# Patient Record
Sex: MALE | Race: WHITE | Employment: FULL TIME | URBAN - METROPOLITAN AREA
[De-identification: names, ages, dates, MRNs, and addresses within clinical notes are randomized per-mention and may not be internally consistent; named-entity substitution may affect disease eponyms.]

---

## 2018-01-20 ENCOUNTER — APPOINTMENT (OUTPATIENT)
Dept: CT IMAGING | Age: 28
End: 2018-01-20
Attending: EMERGENCY MEDICINE
Payer: COMMERCIAL

## 2018-01-20 ENCOUNTER — HOSPITAL ENCOUNTER (EMERGENCY)
Age: 28
Discharge: HOME | End: 2018-01-20
Attending: EMERGENCY MEDICINE
Payer: COMMERCIAL

## 2018-01-20 ENCOUNTER — APPOINTMENT (OUTPATIENT)
Dept: GENERAL RADIOLOGY | Age: 28
End: 2018-01-20
Attending: EMERGENCY MEDICINE
Payer: COMMERCIAL

## 2018-01-20 VITALS
HEIGHT: 69 IN | HEART RATE: 69 BPM | SYSTOLIC BLOOD PRESSURE: 118 MMHG | WEIGHT: 130 LBS | OXYGEN SATURATION: 96 % | BODY MASS INDEX: 19.26 KG/M2 | DIASTOLIC BLOOD PRESSURE: 73 MMHG | RESPIRATION RATE: 16 BRPM | TEMPERATURE: 97.3 F

## 2018-01-20 DIAGNOSIS — M54.2 NECK PAIN: Primary | ICD-10-CM

## 2018-01-20 DIAGNOSIS — M54.6 ACUTE THORACIC BACK PAIN, UNSPECIFIED BACK PAIN LATERALITY: ICD-10-CM

## 2018-01-20 PROCEDURE — 99282 EMERGENCY DEPT VISIT SF MDM: CPT

## 2018-01-20 PROCEDURE — 72050 X-RAY EXAM NECK SPINE 4/5VWS: CPT

## 2018-01-20 PROCEDURE — 72070 X-RAY EXAM THORAC SPINE 2VWS: CPT

## 2018-01-20 RX ORDER — METHOCARBAMOL 750 MG/1
750 TABLET, FILM COATED ORAL 4 TIMES DAILY
Qty: 30 TAB | Refills: 0 | Status: SHIPPED | OUTPATIENT
Start: 2018-01-20

## 2018-01-20 RX ORDER — DICLOFENAC SODIUM 50 MG/1
50 TABLET, DELAYED RELEASE ORAL 2 TIMES DAILY
Qty: 20 TAB | Refills: 0 | Status: SHIPPED | OUTPATIENT
Start: 2018-01-20 | End: 2022-03-09 | Stop reason: ALTCHOICE

## 2018-01-20 NOTE — ED TRIAGE NOTES
Pt reports he was folding a blanket and it got caught on his head pulling his neck and upper back. Pt has had surgery on back due to scoliosis and ah had neck fusion.    Pt saw ortho PA 2 weeks ago and had xrays

## 2018-01-20 NOTE — ED PROVIDER NOTES
HPI Comments: 32 y.o. male with past medical history significant for scoliosis, herniated cervical disc, cervical fusion, and chronic pain who presents from home with chief complaint of neck pain. Pt states he was shaking a blanket, about four hours ago, when the blanket got caught on his head. Pt states he pulled the blanket forward and it jerked his head and neck forward as well. Pt reports currently having lower neck pain and upper back pain that is worsened with movement. Pt states he occasionally has pain radiating down his L-posterior arm. Pt states he took two Advil tablets and used [employer] patches with some relief. Pt reports having a history of scoliosis with two Araiza rods and 18 screws placed in his back. Pt also states he had a cervical fusion done six years ago. Pt denies currently having L-arm weakness. There are no other acute medical concerns at this time. PCP: Tapan Jang MD    Note written by Nakia Bailey, as dictated by Darren Arora MD 6:50 PM    The history is provided by the patient. Past Medical History:   Diagnosis Date    Chronic pain     UPPER BACK, BETWEEN SHOULDER BLADES    Herniated cervical disc     History of blood transfusion 2005    [facility] [address], NO REACTION    Scoliosis        Past Surgical History:   Procedure Laterality Date    HX ADENOIDECTOMY      HX ORTHOPAEDIC  2004    scoliosis - 2 rods and 18 screws    HX OTHER SURGICAL      WISDOM TEETH EXTRACTION    NEUROLOGICAL PROCEDURE UNLISTED  2005    SCOLISOSIS MID AND UPPER BACK         History reviewed. No pertinent family history. Social History     Social History    Marital status: SINGLE     Spouse name: N/A    Number of children: N/A    Years of education: N/A     Occupational History    Not on file.      Social History Main Topics    Smoking status: Current Every Day Smoker     Packs/day: 0.75     Years: 9.00    Smokeless tobacco: Never Used    Alcohol use 1.5 oz/week     3 Cans of beer per week    Drug use: No    Sexual activity: Not on file     Other Topics Concern    Not on file     Social History Narrative         ALLERGIES: Review of patient's allergies indicates no known allergies. Review of Systems   Constitutional: Negative for fever. Eyes: Negative for visual disturbance. Respiratory: Negative for cough, shortness of breath and wheezing. Cardiovascular: Negative for chest pain and leg swelling. Gastrointestinal: Negative for abdominal pain, diarrhea, nausea and vomiting. Genitourinary: Negative for dysuria. Musculoskeletal: Positive for back pain and neck pain. Negative for neck stiffness. Skin: Negative for rash. Neurological: Negative. Negative for syncope, weakness and headaches. Psychiatric/Behavioral: Negative for confusion. All other systems reviewed and are negative. Vitals:    01/20/18 1652   BP: 128/84   Pulse: 70   Resp: 16   Temp: 98.3 °F (36.8 °C)   SpO2: 100%   Weight: 59 kg (130 lb)   Height: 5' 9\" (1.753 m)            Physical Exam   Constitutional: He appears well-developed and well-nourished. No distress. HENT:   Head: Normocephalic. Eyes: Pupils are equal, round, and reactive to light. Cardiovascular: Normal rate and regular rhythm. No murmur heard. Pulmonary/Chest: Effort normal and breath sounds normal. No respiratory distress. Abdominal: Soft. There is no tenderness. Musculoskeletal: He exhibits no edema. Tenderness over lower cervical spine and upper half of thoracic spine. Neurological: He is alert. He has normal strength. No cranial nerve deficit. Skin: Skin is warm and dry. Psychiatric: He has a normal mood and affect. His behavior is normal.   Nursing note and vitals reviewed. Note written by Ruben Hardy.  Elizabeth Craig, as dictated by Ellie Perkins MD 6:50 PM       [facility]  ED Course       Procedures    CONSULT NOTE:  9:20 PM Ellie Perkins MD spoke with JAN Wang, Consult for Orthopedics. Discussed available diagnostic tests and clinical findings. He will review x-rays and send an email to Dr. Lola Perera him of the x-rays. Pt should call the office Monday morning to get an appointment with Dr. Courtney Mauro       Discussed test results, clinical impression,  and need for followup in 1-2 days . Patients questions were answered. Discharge instructions given to patient.

## 2018-01-21 NOTE — ED NOTES
Bedside report received from [facility], patient resting comfortably, patient is tearful, denies any needs at this time, KARLA

## 2018-01-21 NOTE — ED NOTES
Patient A&O x4, verbalizes understanding of discharge instructions and denies any questions at this time, patient V/S stable, patient ambulated off the unit

## 2018-01-21 NOTE — DISCHARGE INSTRUCTIONS

## 2022-03-09 ENCOUNTER — OFFICE VISIT (OUTPATIENT)
Dept: ORTHOPEDIC SURGERY | Age: 32
End: 2022-03-09
Payer: COMMERCIAL

## 2022-03-09 VITALS — BODY MASS INDEX: 20.44 KG/M2 | HEIGHT: 69 IN | WEIGHT: 138 LBS

## 2022-03-09 DIAGNOSIS — M54.2 NECK PAIN: Primary | ICD-10-CM

## 2022-03-09 DIAGNOSIS — Z98.1 STATUS POST CERVICAL SPINAL FUSION: ICD-10-CM

## 2022-03-09 DIAGNOSIS — M48.02 CERVICAL STENOSIS OF SPINE: ICD-10-CM

## 2022-03-09 PROCEDURE — 99204 OFFICE O/P NEW MOD 45 MIN: CPT | Performed by: PHYSICIAN ASSISTANT

## 2022-03-09 RX ORDER — DICLOFENAC SODIUM 75 MG/1
75 TABLET, DELAYED RELEASE ORAL 2 TIMES DAILY WITH MEALS
Qty: 60 TABLET | Refills: 1 | Status: SHIPPED | OUTPATIENT
Start: 2022-03-09

## 2022-03-09 RX ORDER — METOPROLOL TARTRATE 25 MG/1
TABLET, FILM COATED ORAL
COMMUNITY
Start: 2021-10-15

## 2022-03-09 RX ORDER — DEXTROAMPHETAMINE SACCHARATE, AMPHETAMINE ASPARTATE, DEXTROAMPHETAMINE SULFATE AND AMPHETAMINE SULFATE 3.75; 3.75; 3.75; 3.75 MG/1; MG/1; MG/1; MG/1
TABLET ORAL
COMMUNITY

## 2022-03-09 RX ORDER — GABAPENTIN 300 MG/1
300 CAPSULE ORAL
Qty: 30 CAPSULE | Refills: 1 | Status: SHIPPED | OUTPATIENT
Start: 2022-03-09

## 2022-03-09 NOTE — LETTER
CONTROLLED SUBSTANCE MEDICATION AGREEMENT  Patient Name: Saint Mannheim  Patient YOB: 1990   [id number]      I understand, that controlled substance medications may be used to help better manage my symptoms and to improve my ability to function at home, work and in social settings. However, I also understand that these medications do have risks, which have been discussed with me, including possible development of physical or psychological dependence. I understand that successful treatment requires mutual trust and honesty between me and my provider. I understand and agree that following this Medication Agreement is necessary in continuing my provider-patient relationship and the success of my treatment plan. Explanation from my Provider: Benefits and Goals of Controlled Substance Medications: There are two potential goals for your treatment: (1) decreased pain and suffering (2) improved daily life functions. There are many possible treatments for your chronic condition(s). Alternatives such as physical therapy, yoga, massage, home daily exercise, meditation, relaxation techniques, injections, chiropractic manipulations, surgery, cognitive therapy, hypnosis and many medications that are not habit-forming may be used. Use of controlled substance medications may be helpful, but they are unlikely to resolve all symptoms or restore all function. Explanation from my Provider: Risks of Controlled Substance Medications:   Opioid pain medications: These medications can lead to problems such as addiction/dependence, sedation, lightheadedness/dizziness, memory issues, falls, constipation, nausea, or vomiting. They may also impair the ability to drive or operate machinery. Additionally, these medications may lower testosterone levels, leading to loss of bone strength, stamina and sex drive.   They may cause problems with breathing, sleep apnea and reduced coughing, which is especially dangerous for patients with lung disease. Overdose or dangerous interactions with alcohol and other medications may occur, leading to death. Hyperalgesia may develop, which means patients receiving opioids for the treatment of pain may become more sensitive to certain painful stimuli, and in some cases, experience pain from ordinarily non-painful stimuli. Women between the ages of 14-53 who could become pregnant should carefully weigh the risks and benefits of opioids with their physicians, as these medications increase the risk of pregnancy complications, including miscarriage,  delivery and stillbirth. It is also possible for babies to be born addicted to opioids. Opioid dependence withdrawal symptoms may include; feelings of uneasiness, increased pain, irritability, belly pain, diarrhea, sweats and goose-flesh. Testosterone replacement therapy:  Potential side effects include increased risk of stroke and heart attack, blood clots, increased blood pressure, increased cholesterol, enlarged prostate, sleep apnea, irritability/aggression and other mood disorders, and decreased fertility. Trina James (1990)             Page 1 of 4    Initials:_______    Benzodiazepines and non-benzodiazepine sleep medications: These medications can lead to problems such as addiction/dependence, sedation, fatigue, lightheadedness, dizziness, incoordination, falls, depression, hallucinations, and impaired judgment, memory and concentration. The ability to drive and operate machinery may also be affected. Abnormal sleep-related behaviors have been reported, including sleepwalking, driving, making telephone calls, eating, or having sex while not fully awake. These medications can suppress breathing and worsen sleep apnea, particularly when combined with alcohol or other sedating medications, potentially leading to death.  Dependence withdrawal symptoms may include tremors, anxiety, hallucinations and seizures. Stimulants:  Common adverse effects include addiction/dependence, increased blood pressure and heart rate, decreased appetite, nausea, involuntary weight loss, insomnia,  irritability, and headaches. These risks may increase when these medications are combined with other stimulants, such as caffeine pills or energy drinks, certain weight loss supplements and oral decongestants. Dependence withdrawal symptoms may include depressed mood, loss of interest, suicidal thoughts, anxiety, fatigue, appetite changes and agitation. I agree and understand that I and my prescriber have the following rights and responsibilities regarding my treatment plan:   1. MY RIGHTS:  To be informed of my treatment and medication plan. To be an active participant in my health and wellbeing. 2. MY RESPONSIBILITY AND UNDERSTANDING FOR USE OF MEDICATIONS   I will take medications at the dose and frequency as directed. For my safety, I will not increase or change how I take my medications without the recommendation of my healthcare provider.  I will actively participate in any program recommended by my provider which may improve function, including social, physical, psychological programs.  I will not take my medications with alcohol or other drugs not prescribed to me. I understand that drinking alcohol with my medications increases the chances of side effects, including reduced breathing rate and could lead to personal injury when operating machinery.  I understand that if I have a history of substance use disorders, including alcohol or other illicit drugs, that I may be at increased risk of addiction to my medications.  I agree to notify my provider immediately if I should become pregnant so that my treatment plan can be adjusted.    I agree and understand that I shall only receive controlled substance medications from the prescriber that signed this agreement unless there is written agreement among other prescribers of controlled substances outlining the responsibility of the medications being prescribed.  I understand that the if the controlled medication is not helping to achieve goals, the dosage may be tapered and no longer prescribed. 3. MY RESPONSIBILITY FOR COMMUNICATION / PRESCRIPTION RENEWALS   I agree that all controlled substance medications that I take will be prescribed only by my provider. If another healthcare provider prescribes me medication in an emergency, I will notify my provider within seventy-two (72) hours. Saint Mannheim (1990)             Page 2 of 4    Initials:_______  Dinero I will arrange for refills at the prescribed interval ONLY during regular office hours. I will not ask for refills earlier than agreed, after-hours, on holidays or weekends. Refills may take up to 72 hours for processing and prescriptions to reach the pharmacy.  I will inform my other health care providers that I am taking these medications and of the existence of this [id number]. In the event of an emergency, I will provide the same information to the emergency department prescribers.  I will keep my provider updated on the pharmacy I am using for controlled medication prescription filling. 4. MY RESPONSIBILITY FOR PROTECTING MEDICATIONS   I will protect my prescriptions and medications. I understand that lost or misplaced prescriptions will not be replaced.  I will keep medications only for my own use and will not share them with others. I will keep all medications away from children.  I agree that if my medications are adjusted or discontinued, I will properly dispose of any remaining medications. I understand that I will be required to dispose of any remaining controlled medications as, directed by my prescriber, prior to being provided with any prescriptions for other controlled medications.   Medication drop box locations can be found at: [employer]  5. MY RESPONSIBILITY WITH ILLEGAL DRUGS    I will not use illegal or street drugs or another person's prescription medications not prescribed to me.  If there are identified addiction type symptoms, then referral to a program may be provided by my provider and I agree to follow through with this recommendation. 6. MY RESPONSIBILITY FOR COOPERATION WITH INVESTIGATIONS   I understand that my provider will comply with any applicable law and may discuss my use and/or possible misuse/abuse of controlled substances and alcohol, as appropriate, with any health care provider involved in my care, pharmacist, or legal authority.  I authorize my provider and pharmacy to cooperate fully with law enforcement agencies (as permitted by law) in the investigation of any possible misuse, sale, or other diversion of my controlled substances.  I agree to waive any applicable privilege or right of privacy or confidentiality with respect to these authorizations. 7. PROVIDERS RIGHT TO MONITOR FOR SAFETY: PRESCRIPTION MONITORING / DRUG TESTING   I consent to drug/toxicology screening and will submit to a drug screen upon my providers request to assure I am only taking the prescribed drugs for my safety monitoring. I understand that a drug screen is a laboratory test in which a sample of my urine, blood or saliva is checked to see what drugs I have been taking. This may entail an observed urine specimen, which means that a nurse or other health care provider may watch me provide urine, and I will cooperate if I am asked to provide an observed specimen. Cary Lund (1990)             Page 3 of 4    Initials:_______  Anayeli Haas I understand that my provider will check a copy of my State Prescription Monitoring Program () Report in order to safely prescribe medications.      Pill Counts: I consent to pill counts when requested. I may be asked to bring all my prescribed controlled substance medications, in their original bottles, to all of my scheduled appointments. In addition, my provider may ask me to come to the practice at any time for a random pill count. 8. TERMINATION OF THIS AGREEMENT   For my safety, my prescriber has the right to stop prescribing controlled substance medications and may end this agreement.  Conditions that may result in termination of this agreement:  a. I do not show any improvement in pain, or my activity has not improved. b. I develop rapid tolerance or loss of improvement, as described in my treatment plan.  c. I develop significant side effects from the medication. d. My behavior is not consistent with the responsibilities outlined above, thereby causing safety concerns to continue prescribing controlled substance medications. e. I fail to follow the terms of this agreement. f. Other:____________________________     UNDERSTANDING THIS MEDICATION AGREEMENT:    I have read the above and have had all my questions answered. For chronic disease management, I know that my symptoms can be managed with many types of treatments. A chronic medication trial may be part of my treatment, but I must be an active participant in my care. Medication therapy is only one part of my symptom management plan. In some cases, there may be limited scientific evidence to support the chronic use of certain medications to improve symptoms and daily function. Furthermore, in certain circumstances, there may be scientific information that suggests that the use of chronic controlled substances may worsen my symptoms and increase my risk of unintentional death directly related to this medication therapy.   I know that if my provider feels my risk from controlled medications is greater than my benefit, I will have my controlled substance medication(s) compassionately lowered or removed altogether. I further agree to allow this office to contact my HIPAA contact if there are concerns about my safety and use of the controlled medications. I have agreed to use the prescribed controlled substance medications to me as instructed by my provider and as stated in this Medication Agreement. My initial on each page and my signature below shows that I have read each page and I have had the opportunity to ask questions with answers provided by my provider.       Patient Name (Printed): _____________________________________    Patient Signature:  ______________________   Date: _____________      Prescriber Name (Printed): ___________________________________    Prescriber Signature: _____________________  Date: _____________     Kai Fernandez (1990)             Page 4 of 4

## 2022-03-09 NOTE — PROGRESS NOTES
1. Have you been to the ER, urgent care clinic since your last visit? Hospitalized since your last visit? No    2. Have you seen or consulted any other health care providers outside of the [address] [place] [address] since your last visit? Include any pap smears or colon screening.  No    Chief Complaint   Patient presents with    Neck Pain

## 2022-03-14 NOTE — PROGRESS NOTES
Ariana Giles (: 1990) is a 33 y.o. male patient here for evaluation of the following chief complaint(s):  Neck Pain         ASSESSMENT/PLAN:  Below is the assessment and plan developed based on review of pertinent history, physical exam, labs, studies, and medications. 1. Neck pain  -     XR SPINE CERV 4 OR 5 V; Future  -     REFERRAL TO SPINE INJECTION; Future  -     REFERRAL TO PHYSICAL THERAPY; Future  2. Status post cervical spinal fusion  -     REFERRAL TO SPINE INJECTION; Future  -     REFERRAL TO PHYSICAL THERAPY; Future  3. Cervical stenosis of spine  -     gabapentin (Neurontin) 300 mg capsule; Take 1 Capsule by mouth nightly. Max Daily Amount: 300 mg., Normal, Disp-30 Capsule, R-1  -     REFERRAL TO SPINE INJECTION; Future  -     REFERRAL TO PHYSICAL THERAPY; Future      The patient's radiologic findings have been reviewed with him in detail today. He has a prior history of ACDF from  and has had increasing neck pain with right upper extremity radiculopathy over the past 5 to 8 months. He has tried and failed multiple medications with [facility]. He has MRI from them today as well. We have discussed various treatment options, and I think that he is a candidate for bilateral C5-6 epidural steroid injections, for which he has elected to forego at this time. I would like for him to start with outpatient physical therapy as well as medications. He will return for follow-up visit in 2 to 3 months. Return in about 2 months (around 2022) for PT follow up. SUBJECTIVE/OBJECTIVE:  Ariana Giles (: 1990) is a 33 y.o. male who presents today for the following:  Chief Complaint   Patient presents with    Neck Pain        HPI   Mr. Gage Garrett presents today as a previous patient of practice. He is status post C6-7 ACDF from  per Dr. Garrett Hong.   He states that he has been doing well until approximately 5 to 8 months ago when he began with progressively worsening posterior neck pain and stiffness. He states that his symptoms are intermittent in nature and are particularly present with prolonged sitting. He notes that over the past 3 months, he has had diffuse right upper extremity tightness, and pain intermittently. Reports numbness diffusely in the arm but denies any weakness. He has rare left arm symptoms. No difficulty with balance, falls, or dropping objects. He was seen by [address] [place] [address] where he had x-rays and MRI performed. He was given a prescription for diclofenac, gabapentin, steroid, muscle relaxant. He is not taking any of these medications currently. He was recommended for outpatient physical therapy, but has not had any thus far. IMAGING:  XR Results (most recent):  Results from Appointment encounter on 03/09/22    XR SPINE CERV 4 OR 5 V    Narrative  AP, lateral, flexion, and extension films of the cervical spine reveal a stable appearing cervical fusion at C6-7. No evidence of acute fracture, lytic lesion, or instability. There is a loss of normal cervical lordosis. There is slight kyphosis throughout the C5-6 disc space with moderate disc degeneration and spondylosis. Mild disc narrowing at C4-5. MRI Results (most recent):    INDICATION:  CERVICAL RADICULOPATHY.       COMPARISON: None     TECHNIQUE: MR imaging of the cervical spine was performed using the following sequences: sagittal T1, T2, STIR, PD;  axial T2, T1; coronal T2     CONTRAST:  None. FINDINGS:     C6-C7 anterior spinal instrumentation with interbody graft. Partially visualized posterior spinal instrumentation extending from T2 inferiorly off the field-of-view. Anterolisthesis of C2 on C3. Reversal of the normal cervical lordosis. Mild cervical   dextroscoliosis. Partially visualized moderate upper thoracic levoscoliosis. Vertebral body heights are maintained. Degenerative endplate osteophytes and fibrofatty endplate signal changes at C5-C6.  Degenerative plate osteophytes at C7-T1. The craniocervical junction is intact.  The course, caliber, and signal intensity of the spinal cord are normal.       Postsurgical changes in the midline posterior paraspinal soft tissues     C2-C3: Anterolisthesis. No stenosis     C3-C4: Disc bulge. No stenosis     C4-C5: Disc bulge slightly flattening the ventral cord. Mild spinal stenosis. No foraminal stenosis     C5-C6: Disc bulge significantly flattening the ventral cord. Mild spinal stenosis. No foraminal stenosis     C6-C7: Left subarticular endplate osteophytes significantly flattening the left ventral spinal cord. Mild-moderate spinal stenosis. No foraminal stenosis     C7-T1:  No herniation or stenosis. IMPRESSION:   1.   C6-C7 anterior spinal instrumentation with interbody graft. Partially visualized posterior spinal instrumentation extending from T2 inferiorly. Reversal of the normal cervical lordosis. Cervical dextroscoliosis and upper thoracic levoscoliosis. 2.   Several mild to moderate spinal stenoses, most pronounced at C6-C7. No significant foraminal stenoses. [place] [employer]         No Known Allergies    Current Outpatient Medications   Medication Sig    dextroamphetamine-amphetamine (ADDERALL) 15 mg tablet Adderall    metoprolol tartrate (LOPRESSOR) 25 mg tablet     diclofenac EC (VOLTAREN) 75 mg EC tablet Take 1 Tablet by mouth two (2) times daily (with meals).  gabapentin (Neurontin) 300 mg capsule Take 1 Capsule by mouth nightly. Max Daily Amount: 300 mg.    methocarbamol (ROBAXIN-750) 750 mg tablet Take 1 Tab by mouth four (4) times daily. (Patient not taking: Reported on 3/9/2022)    ALPRAZolam (XANAX) 0.25 mg tablet Take 0.25 mg by mouth three (3) times daily as needed. (Patient not taking: Reported on 3/9/2022)    permethrin (ACTICIN) 5 % topical cream apply sparingly as directed, head to toe and rinse off after 8-12 hours. Repeat in 1 week.  (Patient not taking: Reported on 3/9/2022)    hydrOXYzine (ATARAX) 25 mg tablet Take 1 Tab by mouth three (3) times daily as needed for Itching. (Patient not taking: Reported on 3/9/2022)     No current facility-administered medications for this visit. Past Medical History:   Diagnosis Date    Chronic pain     UPPER BACK, BETWEEN SHOULDER BLADES    Herniated cervical disc     History of blood transfusion 2005    [facility] [address], NO REACTION    Scoliosis         Past Surgical History:   Procedure Laterality Date    HX ADENOIDECTOMY      HX ORTHOPAEDIC  2004    scoliosis - 2 rods and 18 screws    HX OTHER SURGICAL      WISDOM TEETH EXTRACTION    NEUROLOGICAL PROCEDURE UNLISTED  2005    SCOLISOSIS MID AND UPPER BACK       History reviewed. No pertinent family history. Social History     Tobacco Use    Smoking status: Current Every Day Smoker     Packs/day: 0.75     Years: 9.00     Pack years: 6.75    Smokeless tobacco: Never Used   Substance Use Topics    Alcohol use: Yes     Alcohol/week: 2.5 standard drinks     Types: 3 Cans of beer per week        Review of Systems   Constitutional: Negative. Respiratory: Negative. Cardiovascular: Negative. Gastrointestinal: Negative. Endocrine: Negative. Genitourinary: Negative. Musculoskeletal: Positive for neck pain and neck stiffness. Skin: Negative. Allergic/Immunologic: Negative. Neurological: Positive for numbness. Negative for weakness. Hematological: Negative. Psychiatric/Behavioral: Negative. All other systems reviewed and are negative. No flowsheet data found. Vitals:  Ht 5' 9\" (1.753 m)   Wt 138 lb (62.6 kg)   BMI 20.38 kg/m²    Body mass index is 20.38 kg/m². Physical Exam    Neurologic  Overall Assessment of Muscle Strength and Tone reveals  Upper Extremities - Right Deltoid - 5/5. Left Deltoid - 5/5. Right Bicep - 5/5. Left Bicep - 5/5. Right Tricep - 5/5. Left Tricep - 5/5. Right Wrist Extensors - 5/5. Left Wrist Extensors - 5/5.  Right Wrist Flexors - 5/5. Left Wrist Flexors - 5/5. Right Intrinsics - 5/5. Left Intrinsics - 5/5. General Assessment of Reflexes  Right Hand - Mendez's sign is negative in the right hand. Left Hand - Mendez's sign is negative in the left hand. Reflexes (Dermatomes)  2/2 Normal - Left Bicep (C5-6), Left Tricep (C7-8), Left Brachioradialis (C5-6), Right Bicep (C5-6), Right Tricep (C7-8) and Right Brachioradialis (C5-6). Musculoskeletal  Global Assessment  Examination of related systems reveals - well-developed, well-nourished, in no acute distress, alert and oriented x 3 and normal coordination. Gait and Station - normal gait and station and normal posture. Spine/Ribs/Pelvis  Cervical Spine - Evaluation of related systems reveals - no lymphadenopathy and neurovascularly intact bilaterally. Inspection and Palpation - Tenderness - moderate and localized. Assessment of pain reveals the following findings: - Location - cervical area. ROJM - Flexion - 85 °. Right Lateral Flexion - 35 °. Left Lateral Flexion - 35 °. Extension - 70 °. Right Rotation - 80 °. Left Rotation - 80 °. Cervical Spine - Functional Testing - Foraminal Compression/Spurling's Test negative, Shoulder Depression Test negative, Upper Limb Tension Test negative. Lumbosacral Spine - Examination of the lumbosacral spine reveals - no tenderness to palpation, no pain, normal strength and tone, no laxity or crepitus and normal lumbosacral spine movements. Dr. Riley Mcgrath was available for immediate consult during this encounter. An electronic signature was used to authenticate this note.   -- JAN Ron